# Patient Record
Sex: MALE | Race: WHITE | NOT HISPANIC OR LATINO
[De-identification: names, ages, dates, MRNs, and addresses within clinical notes are randomized per-mention and may not be internally consistent; named-entity substitution may affect disease eponyms.]

---

## 2022-03-10 PROBLEM — Z00.00 ENCOUNTER FOR PREVENTIVE HEALTH EXAMINATION: Status: ACTIVE | Noted: 2022-03-10

## 2022-03-14 ENCOUNTER — APPOINTMENT (OUTPATIENT)
Dept: SURGERY | Facility: CLINIC | Age: 67
End: 2022-03-14
Payer: MEDICARE

## 2022-03-14 VITALS
OXYGEN SATURATION: 97 % | HEART RATE: 62 BPM | DIASTOLIC BLOOD PRESSURE: 81 MMHG | WEIGHT: 169 LBS | SYSTOLIC BLOOD PRESSURE: 153 MMHG | TEMPERATURE: 96.7 F | HEIGHT: 71 IN | BODY MASS INDEX: 23.66 KG/M2

## 2022-03-14 PROCEDURE — 99204 OFFICE O/P NEW MOD 45 MIN: CPT

## 2022-03-14 RX ORDER — LOSARTAN POTASSIUM 50 MG/1
50 TABLET, FILM COATED ORAL
Refills: 0 | Status: ACTIVE | COMMUNITY

## 2022-03-14 NOTE — REVIEW OF SYSTEMS
[Heart Rate Is Slow] : the heart rate was not slow [Heart Rate Is Fast] : fast heart rate [Chest Pain] : no chest pain [Palpitations] : palpitations [Leg Claudication] : no intermittent leg claudication [Lower Ext Edema] : no extremity edema [Abdominal Pain] : no abdominal pain [Vomiting] : no vomiting [Constipation] : constipation [Diarrhea] : no diarrhea [Heartburn] : no heartburn [Melena] : no melena [Suicidal] : not suicidal [Sleep Disturbances] : no sleep disturbances [Anxiety] : anxiety [Depression] : no depression [Change In Personality] : no personality change [Emotional Problems] : no emotional problems [Negative] : Heme/Lymph

## 2022-03-14 NOTE — ASSESSMENT
[FreeTextEntry1] : Mr. Barger is a 66-year-old man with a left inguinal hernia, an umbilical/supraumbilical hernia, and a possible right inguinal hernia.  We will plan for a robotic-assisted left, possible bilateral inguinal hernia repair with mesh and umbilical hernia repair with possible mesh on June 7, 2022.

## 2022-03-14 NOTE — CONSULT LETTER
[FreeTextEntry1] : 2022\par \par \par \par Go Jo M.D.\par [address]\[place] [address]\par Telephone #: [phone]\par \par \par Re: Guillermo Barger\par : 1955\par \par \par Dear Dr. Jo:\par \par I had the opportunity to see Mr. Barger today for evaluation and management of a left inguinal hernia.  He stated the hernia has been present for "a few" years.  He first noticed the hernia as groin pain.  He reported that his activities are somewhat limited due to pain from the hernia, although he is able to reduce the hernia.  He stated the hernia is enlarging.\par \par On physical examination, his height is 5 feet 11 inches, weight is 169 pounds, and BMI is 23.57.  His temperature is 96.7 °F, blood pressure is 153/81, heart rate is 62, and O2 saturation is 97% on room air.  In general, he is a well-dressed, well-nourished man who appears his stated age and is in no acute distress. He is calm, alert, and oriented x3.  HEENT exam demonstrates no scleral icterus and a normocephalic atraumatic appearance.  His abdomen has audible bowel sounds, is soft, non-tender, and non-distended.  There is no hepatosplenomegaly.  There is a small, reducible umbilical/supraumbilical hernia.  His extremities are warm and dry with no clubbing, cyanosis, or edema.  Bilateral groin examination demonstrates a reducible left inguinal hernia and a possible right inguinal hernia.\par \par In summary, Mr. Barger is a 66-year-old man with a left inguinal hernia, an umbilical/supraumbilical hernia, and a possible right inguinal hernia.  We will plan for a robotic-assisted left, possible bilateral inguinal hernia repair with mesh and umbilical hernia repair with possible mesh on 2022.\par \par Thank you for the opportunity to care for this patient.  Please do not hesitate to contact me in the event that you have any questions or concerns about the care of this patient.\par \par Sincerely,\par \par \par \par Augusta Levi M.D.\par

## 2022-03-14 NOTE — HISTORY OF PRESENT ILLNESS
[de-identified] : Mr. Barger presented today for evaluation and management of a left inguinal hernia.  He stated the hernia has been present for "a few" years.  He first noticed the hernia as groin pain.  He reported that his activities are somewhat limited due to pain from the hernia, although he is able to reduce the hernia.  He stated the hernia is enlarging.

## 2022-03-14 NOTE — PHYSICAL EXAM
[Calm] : calm [de-identified] : NAD, comfortable [de-identified] : NCAT, no scleral icterus [de-identified] : +BS soft NT ND.  No hepatosplenomegaly.  Small reducible umbilical/supraumbilical hernia. [de-identified] : Reducible left inguinal hernia.  Possible right inguinal hernia. [de-identified] : No clubbing, cyanosis, or edema. [de-identified] : Warm, dry. [de-identified] : A&Ox3

## 2022-06-01 DIAGNOSIS — Z01.818 ENCOUNTER FOR OTHER PREPROCEDURAL EXAMINATION: ICD-10-CM

## 2022-06-06 ENCOUNTER — TRANSCRIPTION ENCOUNTER (OUTPATIENT)
Age: 67
End: 2022-06-06

## 2022-06-06 NOTE — ASU PATIENT PROFILE, ADULT - PATIENT KNOW
OFFICE VISIT      Patient: Echo Burden Date of Service: 10/19/2020   : 1964 MRN: [id number]     SUBJECTIVE:   HISTORY OF PRESENT ILLNESS:  Echo Burden is a 56 year old female who presents today for influenza vaccine.      There is no problem list on file for this patient.        PAST MEDICAL HISTORY:  No past medical history on file.    MEDICATIONS:  No current outpatient medications on file.     No current facility-administered medications for this visit.        ALLERGIES:  ALLERGIES:   Allergen Reactions   • Sulfites   (Food Or Med) SHORTNESS OF BREATH   • Aspirin Cough       PAST SURGICAL HISTORY:  No past surgical history on file.    FAMILY HISTORY:  No family history on file.    SOCIAL HISTORY:  Social History     Tobacco Use   • Smoking status: Not on file   Substance Use Topics   • Alcohol use: Not on file   • Drug use: Not on file             OBJECTIVE:     Visit Vitals  Temp 98.2 °F (36.8 °C) (Temporal)                 ASSESSMENT AND PLAN:   No results found for this visit on 10/19/20.    1. Need for vaccination        Orders Placed This Encounter   • Influenza Quadrivalent Split Pres Free 0.5 mL Pre-filled Vacc (FluLaval, Fluzone, Fluarix; ages 6+ months - [id number]       -Supportive therapies, see instructions provided as documented in the AVS.      No follow-ups on file.      SE of medications and indications for stopping/contacting provider thoroughly reviewed  Red flag S&S that warrant emergent reevaluation thoroughly reviewed    Pt verbalized good understanding  Vaccine Information Statement(s) was given today. This has been reviewed, questions answered, and verbal consent given by Patient for injection(s) and administration of Influenza (Inactivated).    Patient tolerated without incident. See immunization grid for documentation.          
yes

## 2022-06-06 NOTE — ASU PATIENT PROFILE, ADULT - ABLE TO REACH PT
[phone]: 7:30 am arrival time, NPO solids after 11:30 pm tonight, clears until 6:30 am on DOS, photo ID and insurance card needed, no contact lenes or jewelry, Escort need photo ID and proof of Covidd vaccine or a negative Covid PCR test within 3 DOS, address and phone # left on voicemail.

## 2022-06-06 NOTE — ASU PATIENT PROFILE, ADULT - NSICDXPASTMEDICALHX_GEN_ALL_CORE_FT
PAST MEDICAL HISTORY:  Acid reflux     Hepatitis-C     HTN (hypertension)     Urination frequency

## 2022-06-06 NOTE — ASU PATIENT PROFILE, ADULT - FALL HARM RISK - UNIVERSAL INTERVENTIONS
Bed in lowest position, wheels locked, appropriate side rails in place/Call bell, personal items and telephone in reach/Instruct patient to call for assistance before getting out of bed or chair/Non-slip footwear when patient is out of bed/[place] to call system/Physically safe environment - no spills, clutter or unnecessary equipment/Purposeful Proactive Rounding/Room/bathroom lighting operational, light cord in reach

## 2022-06-07 ENCOUNTER — RESULT REVIEW (OUTPATIENT)
Age: 67
End: 2022-06-07

## 2022-06-07 ENCOUNTER — OUTPATIENT (OUTPATIENT)
Dept: OUTPATIENT SERVICES | Facility: HOSPITAL | Age: 67
LOS: 1 days | Discharge: HOME | End: 2022-06-07
Payer: MEDICARE

## 2022-06-07 ENCOUNTER — TRANSCRIPTION ENCOUNTER (OUTPATIENT)
Age: 67
End: 2022-06-07

## 2022-06-07 ENCOUNTER — APPOINTMENT (OUTPATIENT)
Dept: SURGERY | Facility: CLINIC | Age: 67
End: 2022-06-07

## 2022-06-07 VITALS
TEMPERATURE: 98 F | RESPIRATION RATE: 16 BRPM | WEIGHT: 161.6 LBS | SYSTOLIC BLOOD PRESSURE: 147 MMHG | HEIGHT: 71 IN | OXYGEN SATURATION: 99 % | DIASTOLIC BLOOD PRESSURE: 79 MMHG | HEART RATE: 70 BPM

## 2022-06-07 VITALS
TEMPERATURE: 97 F | DIASTOLIC BLOOD PRESSURE: 88 MMHG | SYSTOLIC BLOOD PRESSURE: 150 MMHG | RESPIRATION RATE: 16 BRPM | OXYGEN SATURATION: 96 % | HEART RATE: 75 BPM

## 2022-06-07 PROCEDURE — 88304 TISSUE EXAM BY PATHOLOGIST: CPT | Mod: 26

## 2022-06-07 PROCEDURE — 88302 TISSUE EXAM BY PATHOLOGIST: CPT | Mod: 26

## 2022-06-07 PROCEDURE — 49650 LAP ING HERNIA REPAIR INIT: CPT | Mod: LT

## 2022-06-07 PROCEDURE — S2900 ROBOTIC SURGICAL SYSTEM: CPT

## 2022-06-07 PROCEDURE — 22902 EXC ABD LES SC < 3 CM: CPT

## 2022-06-07 PROCEDURE — 49585: CPT

## 2022-06-07 DEVICE — MESH HERNIA INGUINAL 3DMAX LARGE 4 X 6" LEFT: Type: IMPLANTABLE DEVICE | Site: BILATERAL | Status: FUNCTIONAL

## 2022-06-07 DEVICE — MESH HERNIA INGUINAL 3DMAX LARGE 4 X 6" RIGHT: Type: IMPLANTABLE DEVICE | Site: BILATERAL | Status: FUNCTIONAL

## 2022-06-07 RX ORDER — SODIUM CHLORIDE 9 MG/ML
1000 INJECTION, SOLUTION INTRAVENOUS
Refills: 0 | Status: DISCONTINUED | OUTPATIENT
Start: 2022-06-07 | End: 2022-06-07

## 2022-06-07 RX ORDER — FENTANYL CITRATE 50 UG/ML
25 INJECTION INTRAVENOUS
Refills: 0 | Status: DISCONTINUED | OUTPATIENT
Start: 2022-06-07 | End: 2022-06-07

## 2022-06-07 RX ORDER — OXYCODONE HYDROCHLORIDE 5 MG/1
5 TABLET ORAL ONCE
Refills: 0 | Status: DISCONTINUED | OUTPATIENT
Start: 2022-06-07 | End: 2022-06-07

## 2022-06-07 RX ORDER — LOSARTAN POTASSIUM 100 MG/1
1 TABLET, FILM COATED ORAL
Qty: 0 | Refills: 0 | DISCHARGE

## 2022-06-07 RX ORDER — OXYCODONE HYDROCHLORIDE 5 MG/1
1 TABLET ORAL
Qty: 5 | Refills: 0
Start: 2022-06-07

## 2022-06-07 RX ORDER — CHLORHEXIDINE GLUCONATE 213 G/1000ML
1 SOLUTION TOPICAL ONCE
Refills: 0 | Status: COMPLETED | OUTPATIENT
Start: 2022-06-07 | End: 2022-06-07

## 2022-06-07 RX ORDER — ACETAMINOPHEN 500 MG
1000 TABLET ORAL ONCE
Refills: 0 | Status: COMPLETED | OUTPATIENT
Start: 2022-06-07 | End: 2022-06-07

## 2022-06-07 RX ORDER — APREPITANT 80 MG/1
40 CAPSULE ORAL ONCE
Refills: 0 | Status: COMPLETED | OUTPATIENT
Start: 2022-06-07 | End: 2022-06-07

## 2022-06-07 RX ORDER — DOCUSATE SODIUM 100 MG
1 CAPSULE ORAL
Qty: 20 | Refills: 0
Start: 2022-06-07

## 2022-06-07 RX ORDER — ONDANSETRON 8 MG/1
4 TABLET, FILM COATED ORAL ONCE
Refills: 0 | Status: DISCONTINUED | OUTPATIENT
Start: 2022-06-07 | End: 2022-06-07

## 2022-06-07 RX ADMIN — SODIUM CHLORIDE 100 MILLILITER(S): 9 INJECTION, SOLUTION INTRAVENOUS at 13:06

## 2022-06-07 RX ADMIN — OXYCODONE HYDROCHLORIDE 5 MILLIGRAM(S): 5 TABLET ORAL at 13:13

## 2022-06-07 RX ADMIN — OXYCODONE HYDROCHLORIDE 5 MILLIGRAM(S): 5 TABLET ORAL at 12:52

## 2022-06-07 RX ADMIN — FENTANYL CITRATE 25 MICROGRAM(S): 50 INJECTION INTRAVENOUS at 12:28

## 2022-06-07 RX ADMIN — Medication 1000 MILLIGRAM(S): at 09:10

## 2022-06-07 RX ADMIN — FENTANYL CITRATE 25 MICROGRAM(S): 50 INJECTION INTRAVENOUS at 12:30

## 2022-06-07 RX ADMIN — FENTANYL CITRATE 25 MICROGRAM(S): 50 INJECTION INTRAVENOUS at 12:12

## 2022-06-07 RX ADMIN — FENTANYL CITRATE 25 MICROGRAM(S): 50 INJECTION INTRAVENOUS at 12:45

## 2022-06-07 RX ADMIN — CHLORHEXIDINE GLUCONATE 1 APPLICATION(S): 213 SOLUTION TOPICAL at 08:40

## 2022-06-07 RX ADMIN — APREPITANT 40 MILLIGRAM(S): 80 CAPSULE ORAL at 09:10

## 2022-06-07 NOTE — ASU DISCHARGE PLAN (ADULT/PEDIATRIC) - CARE PROVIDER_API CALL
Augusta Levi)  Surgery  [address]  [place], [place] [address]  Phone: [phone]  Fax: [phone]  Follow Up Time:

## 2022-06-07 NOTE — BRIEF OPERATIVE NOTE - NSICDXBRIEFPROCEDURE_GEN_ALL_CORE_FT
PROCEDURES:  Robot-assisted repair of inguinal hernia 07-Jun-2022 12:20:12 bilateral repair Thelma Doyle  Open repair of umbilical hernia in adult 07-Jun-2022 12:22:28  Thelma Doyle

## 2022-06-07 NOTE — BRIEF OPERATIVE NOTE - NSICDXBRIEFPREOP_GEN_ALL_CORE_FT
PRE-OP DIAGNOSIS:  Hernia, inguinal, left 07-Jun-2022 12:20:31  Thelma Doyle  Umbilical hernia 07-Jun-2022 12:22:11  Thelma Doyle

## 2022-06-07 NOTE — ASU DISCHARGE PLAN (ADULT/PEDIATRIC) - COMMENTS
Please Dr. Levi office tomorrow to schedule an appointment for a week or two from the date of surgery

## 2022-06-07 NOTE — BRIEF OPERATIVE NOTE - NSICDXBRIEFPOSTOP_GEN_ALL_CORE_FT
POST-OP DIAGNOSIS:  S/P bilateral inguinal hernia repair 07-Jun-2022 12:21:02  Thelma Doyle  Obturator hernia 07-Jun-2022 12:21:44 left Thelma Doyle  H/O umbilical hernia repair 07-Jun-2022 12:22:42  Thelma Doyle

## 2022-06-07 NOTE — ASU DISCHARGE PLAN (ADULT/PEDIATRIC) - NS MD DC FALL RISK RISK
For information on Fall & Injury Prevention, visit: [email or web address] OR  [email or web address] OR  https://www.cdc.gov/steadi/patient.html

## 2022-06-07 NOTE — BRIEF OPERATIVE NOTE - OPERATION/FINDINGS
The abdominal cavity was accessed using the open jeremiah technique. Two other trocars were placed under direct vision. Left sided indirect inguinal hernia and left obturator hernia were repaired. Left cord lipoma was excised. Right direct inguinal hernia was repaired and direct space lipoma was excised. Umbilical hernia was repaired with Maxone sutures. Hemostasis achieved. Fascia and skin were closed.

## 2022-06-08 ENCOUNTER — NON-APPOINTMENT (OUTPATIENT)
Age: 67
End: 2022-06-08

## 2022-06-08 PROBLEM — R35.0 FREQUENCY OF MICTURITION: Chronic | Status: ACTIVE | Noted: 2022-06-07

## 2022-06-08 PROBLEM — K21.9 GASTRO-ESOPHAGEAL REFLUX DISEASE WITHOUT ESOPHAGITIS: Chronic | Status: ACTIVE | Noted: 2022-06-07

## 2022-06-08 PROBLEM — B19.20 UNSPECIFIED VIRAL HEPATITIS C WITHOUT HEPATIC COMA: Chronic | Status: ACTIVE | Noted: 2022-06-07

## 2022-06-08 PROBLEM — I10 ESSENTIAL (PRIMARY) HYPERTENSION: Chronic | Status: ACTIVE | Noted: 2022-06-07

## 2022-06-17 LAB — SURGICAL PATHOLOGY STUDY: SIGNIFICANT CHANGE UP

## 2022-06-20 ENCOUNTER — APPOINTMENT (OUTPATIENT)
Dept: SURGERY | Facility: CLINIC | Age: 67
End: 2022-06-20
Payer: MEDICARE

## 2022-06-20 VITALS
WEIGHT: 165 LBS | OXYGEN SATURATION: 99 % | TEMPERATURE: 97.2 F | DIASTOLIC BLOOD PRESSURE: 85 MMHG | BODY MASS INDEX: 23.1 KG/M2 | HEIGHT: 71 IN | SYSTOLIC BLOOD PRESSURE: 157 MMHG | HEART RATE: 72 BPM

## 2022-06-20 DIAGNOSIS — Z86.19 PERSONAL HISTORY OF OTHER INFECTIOUS AND PARASITIC DISEASES: ICD-10-CM

## 2022-06-20 DIAGNOSIS — K42.9 UMBILICAL HERNIA W/OUT OBSTRUCTION OR GANGRENE: ICD-10-CM

## 2022-06-20 DIAGNOSIS — Z72.89 OTHER PROBLEMS RELATED TO LIFESTYLE: ICD-10-CM

## 2022-06-20 DIAGNOSIS — Z87.898 PERSONAL HISTORY OF OTHER SPECIFIED CONDITIONS: ICD-10-CM

## 2022-06-20 DIAGNOSIS — Z82.61 FAMILY HISTORY OF ARTHRITIS: ICD-10-CM

## 2022-06-20 DIAGNOSIS — K40.20 BILATERAL INGUINAL HERNIA, W/OUT OBSTRUCTION OR GANGRENE, NOT SPECIFIED AS RECURRENT: ICD-10-CM

## 2022-06-20 DIAGNOSIS — Z87.891 PERSONAL HISTORY OF NICOTINE DEPENDENCE: ICD-10-CM

## 2022-06-20 DIAGNOSIS — Z86.79 PERSONAL HISTORY OF OTHER DISEASES OF THE CIRCULATORY SYSTEM: ICD-10-CM

## 2022-06-20 DIAGNOSIS — Z78.9 OTHER SPECIFIED HEALTH STATUS: ICD-10-CM

## 2022-06-20 PROCEDURE — 99024 POSTOP FOLLOW-UP VISIT: CPT

## 2022-06-20 NOTE — ASSESSMENT
[FreeTextEntry1] : Mr. Barger is a 66-year-old man who underwent a robotic-assisted bilateral inguinal hernia repair with mesh, umbilical hernia repair, and excision of an umbilical skin tag on June 7, 2022.  He is recovering as expected and will follow up with me as needed.

## 2022-06-20 NOTE — CONSULT LETTER
[FreeTextEntry1] : 2022\par \par \par \par Go Jo M.D.\par [address]\[place] [address]\par Telephone #: [phone]\par \par \par Re: Guillermo Barger\par : 1955\par \par \par Dear Dr. Jo:\par \par I had the opportunity to see Mr. Barger today for a routine post-operative visit after he underwent a robotic-assisted bilateral inguinal hernia repair with mesh, umbilical hernia repair, and excision of an umbilical skin tag on 2022.  He is overall feeling well.  He denied fever, chills, nausea, vomiting, diarrhea, or constipation.\par \par On physical examination, his height is 5 feet 11 inches, weight is 165 pounds, and BMI is 23.01.  His temperature is 97.2 °F, blood pressure is 157/85, heart rate is 72, and O2 saturation is 99% on room air.  In general, he is a well-dressed, well-nourished man who appears his stated age and is in no acute distress. He is calm, alert, and oriented x3.  HEENT exam demonstrates no scleral icterus and a normocephalic atraumatic appearance.  His abdomen is soft, non-tender, and non-distended.  The incisions are healing well without evidence of a recurrent umbilical hernia or any incisional hernias with Valsalva maneuver.  His extremities are warm and dry with no clubbing, cyanosis, or edema.  Bilateral groin examination demonstrates no evidence of a recurrent inguinal hernia bilaterally with Valsalva maneuver.\par \par I reviewed the pathology with the patient, which demonstrated: \par      Umbilical hernia contents - adipose and fibrous tissue.\par      Umbilical skin tag - polypoid skin fragment with an intradermal nevus.\par      Right direct lipoma - hypertrophic adipose tissue.\par \par In summary, Mr. Barger is a 66-year-old man who underwent a robotic-assisted bilateral inguinal hernia repair with mesh, umbilical hernia repair, and excision of an umbilical skin tag on 2022.  He is recovering as expected and will follow up with me as needed.\par \par Thank you for the opportunity to care for this patient.  Please do not hesitate to contact me in the event that you have any questions or concerns about the care of this patient.\par \par Sincerely,\par \par \par \par Augusta Levi M.D.

## 2022-06-20 NOTE — PHYSICAL EXAM
[Calm] : calm [de-identified] : NAD, comfortable [de-identified] : NCAT, no scleral icterus [de-identified] : soft NT ND.  Incisions healing well without erythema or induration.  No evidence of a recurrent umbilical hernia or any incisional hernias on Valsalva maneuver. [de-identified] : Bilateral groin examination demonstrates no evidence of a recurrent inguinal hernia bilaterally with Valsalva maneuver. [de-identified] : No clubbing, cyanosis, or edema. [de-identified] : Warm, dry. [de-identified] : A&Ox3

## 2022-06-20 NOTE — DATA REVIEWED
[FreeTextEntry1] : Pathology (6/7/2022):\par Umbilical hernia contents - adipose and fibrous tissue.\par Umbilical skin tag - polypoid skin fragment with an intradermal nevus.\par Right direct lipoma - hypertrophic adipose tissue.

## 2022-06-20 NOTE — REVIEW OF SYSTEMS
[Heart Rate Is Fast] : fast heart rate [Palpitations] : palpitations [Constipation] : constipation [Anxiety] : anxiety [Negative] : Heme/Lymph [Heart Rate Is Slow] : the heart rate was not slow [Chest Pain] : no chest pain [Leg Claudication] : no intermittent leg claudication [Lower Ext Edema] : no extremity edema [Abdominal Pain] : no abdominal pain [Vomiting] : no vomiting [Diarrhea] : no diarrhea [Heartburn] : no heartburn [Melena] : no melena [Suicidal] : not suicidal [Sleep Disturbances] : no sleep disturbances [Depression] : no depression [Change In Personality] : no personality change [Emotional Problems] : no emotional problems

## 2022-06-20 NOTE — REASON FOR VISIT
[Post Op: _________] : a [unfilled] post op visit [FreeTextEntry1] : He underwent a robotic-assisted bilateral inguinal hernia repair with mesh, umbilical hernia repair, and excision of an umbilical skin tag on June 7, 2022.

## 2022-06-20 NOTE — HISTORY OF PRESENT ILLNESS
[de-identified] : Mr. Bargre presented previously for evaluation and management of a left inguinal hernia.  He stated the hernia has been present for "a few" years.  He first noticed the hernia as groin pain.  He reported that his activities are somewhat limited due to pain from the hernia, although he is able to reduce the hernia.  He stated the hernia is enlarging.  He underwent a robotic-assisted bilateral inguinal hernia repair with mesh, umbilical hernia repair, and excision of an umbilical skin tag on June 7, 2022. [de-identified] : He presented today for a routine post-operative visit.  He is overall feeling well.  He denied fever, chills, nausea, vomiting, diarrhea, or constipation.

## 2022-11-09 ENCOUNTER — NON-APPOINTMENT (OUTPATIENT)
Age: 67
End: 2022-11-09

## 2022-11-10 ENCOUNTER — NON-APPOINTMENT (OUTPATIENT)
Age: 67
End: 2022-11-10

## 2022-11-16 DIAGNOSIS — R16.0 HEPATOMEGALY, NOT ELSEWHERE CLASSIFIED: ICD-10-CM

## 2022-11-28 ENCOUNTER — APPOINTMENT (OUTPATIENT)
Dept: SURGICAL ONCOLOGY | Facility: CLINIC | Age: 67
End: 2022-11-28

## (undated) DEVICE — MARKING PEN W RULER

## (undated) DEVICE — SUPP ATHLETIC MALE XLG 44-55IN

## (undated) DEVICE — XI DRAPE COLUMN

## (undated) DEVICE — ELCTR BOVIE PENCIL HANDPIECE ROCKER SWITCH 15FT

## (undated) DEVICE — WARMING BLANKET LOWER ADULT

## (undated) DEVICE — DRSG SUPPORTER ADULT 3" WAISTBAND MED

## (undated) DEVICE — XI ARM FORCEP FENESTRATED BIPOLAR 8MM

## (undated) DEVICE — SUT PDO 0 1/2 CIRCLE 22MM NDL 20CM

## (undated) DEVICE — SUT VICRYL 2-0 27" SH

## (undated) DEVICE — DRAPE TOP SHEET 53" X 101"

## (undated) DEVICE — XI DRAPE ARM

## (undated) DEVICE — XI SEAL UNIV 5- 8 MM

## (undated) DEVICE — SUT VICRYL 2-0 27" UR-6

## (undated) DEVICE — GOWN ROYAL SILK XL

## (undated) DEVICE — SUT MAXON 0 30" GS-11

## (undated) DEVICE — SUT MONOCRYL 4-0 27" PS-2 UNDYED

## (undated) DEVICE — GLV 6.5 PROTEXIS (WHITE)

## (undated) DEVICE — ELCTR GROUNDING PAD ADULT COVIDIEN

## (undated) DEVICE — GLV 7 PROTEXIS (WHITE)

## (undated) DEVICE — TROCAR COVIDIEN VERSAONE BLUNT TIP HASSAN 12MM

## (undated) DEVICE — SLV COMPRESSION KNEE MED

## (undated) DEVICE — XI OBTURATOR OPTICAL BLADELESS 8MM

## (undated) DEVICE — D HELP - CLEARVIEW CLEARIFY SYSTEM

## (undated) DEVICE — SUT VICRYL 0 27" UR-6

## (undated) DEVICE — DRSG SUPPORTER ADULT 3" WAISTBAND LRG